# Patient Record
Sex: MALE | URBAN - METROPOLITAN AREA
[De-identification: names, ages, dates, MRNs, and addresses within clinical notes are randomized per-mention and may not be internally consistent; named-entity substitution may affect disease eponyms.]

---

## 2020-10-19 ENCOUNTER — OFFICE VISIT (OUTPATIENT)
Dept: URBAN - METROPOLITAN AREA CLINIC 52 | Facility: CLINIC | Age: 35
End: 2020-10-19
Payer: COMMERCIAL

## 2020-10-19 DIAGNOSIS — H05.20 EXOPHTHALMOS: ICD-10-CM

## 2020-10-19 PROCEDURE — 99204 OFFICE O/P NEW MOD 45 MIN: CPT | Performed by: OPHTHALMOLOGY

## 2020-10-19 ASSESSMENT — INTRAOCULAR PRESSURE
OS: 20
OD: 21
OD: 20
OS: 22

## 2020-10-19 NOTE — IMPRESSION/PLAN
Impression: Cortical age-related cataract, left eye: H25.012.  Plan: WOULD LIKE RETINA TO SEE TO ENSURE NO INTRA-OCULAR PATHOLOGY, GIVEN UNILATERAL CATARACT

## 2020-10-19 NOTE — IMPRESSION/PLAN
Impression: Exophthalmos: H05.20. Plan: recommend pt to see Dr Mary Lou Albertsing for proptosis consult to confirm my thoughts on the relative proptosis.

## 2020-10-19 NOTE — IMPRESSION/PLAN
Impression: Cortical age-related cataract, left eye: H25.012. Plan: OK for ce/iol  OS in [place] [address], RL2. AIM FAR. Discussed lens options, Standard. Pt is not a candidate for premium lens. Discussed need for CL for OD. Discussed diagnosis of cataracts. Cataracts are limiting vision. Discussed risks, benefits and alternatives to surgery including but not limited to: bleeding, infection, risk of vision loss, loss of the eye, need for other surgery. Patient voiced understanding and wishes to proceed. NOTE RE: PROPTOSIS. PT HAD A CT SCAN, WHICH REVEALED PROPTOSIS LEFT--BUT FELT TO BE DUE TO RIGHT HYPOPLASTIC ORBIT, SO THE PROPTOSIS IS RELATIVE.   CT SCAN FROM [facility] AT [facility] , AND IS SCANNED INTO THIS RECORD

## 2020-10-23 ENCOUNTER — NEW PATIENT (OUTPATIENT)
Dept: URBAN - METROPOLITAN AREA CLINIC 56 | Facility: CLINIC | Age: 35
End: 2020-10-23
Payer: COMMERCIAL

## 2020-10-23 DIAGNOSIS — H05.241 CONSTANT EXOPHTHALMOS, RIGHT EYE: Primary | ICD-10-CM

## 2020-10-23 DIAGNOSIS — H25.812 COMBINED FORMS OF AGE-RELATED CATARACT, LEFT EYE: ICD-10-CM

## 2020-10-23 DIAGNOSIS — H25.11 AGE-RELATED NUCLEAR CATARACT, RIGHT EYE: ICD-10-CM

## 2020-10-23 PROCEDURE — 92004 COMPRE OPH EXAM NEW PT 1/>: CPT | Performed by: OPTOMETRIST

## 2020-10-23 PROCEDURE — 92134 CPTRZ OPH DX IMG PST SGM RTA: CPT | Performed by: OPTOMETRIST

## 2020-10-23 ASSESSMENT — INTRAOCULAR PRESSURE
OS: 15
OD: 16

## 2020-10-23 ASSESSMENT — KERATOMETRY
OS: 43.55
OD: 43.63

## 2020-10-23 ASSESSMENT — VISUAL ACUITY
OS: 20/CF 4'
OD: 20/20

## 2021-02-12 ENCOUNTER — OFFICE VISIT (OUTPATIENT)
Dept: URBAN - METROPOLITAN AREA CLINIC 52 | Facility: CLINIC | Age: 36
End: 2021-02-12
Payer: COMMERCIAL

## 2021-02-12 PROCEDURE — 92014 COMPRE OPH EXAM EST PT 1/>: CPT | Performed by: OPHTHALMOLOGY

## 2021-02-12 ASSESSMENT — INTRAOCULAR PRESSURE
OS: 19
OS: 15
OD: 19
OD: 15

## 2021-02-12 NOTE — IMPRESSION/PLAN
Impression: Cortical age-related cataract, left eye: H25.012. Plan: Discussed diagnosis with patient. Patient has not been able to see retina. Discussed with patient that we will work on referral to retinal specialist--Dr. Carl Sylvester, with our organization,  to have b-scan ultrasound done--THIS IS NECESSARY PRIOR TO CATARACT TO SEE IF THERE IS ANY PATHOLOGY BEHIND THE CATARACT, AND MUST BE DONE BY [facility]. Will refer patient to retinal specialist for eval and b-scan. Will get in contact with retinal specialist to determine the best course of action before proceeding with cataract surgery. Addendum 03/26/21:
Patient was cleared by retina after having B-Scan. Will proceed with surgery as directed:
OK for ce/iol OS in ASC, RL2. AIM TBD. Discussed lens options, Standard or Toric - pending LUDWIG. Patient is not a candidate for MF lens. Discussed need for glasses for near vision with standard. Discussed diagnosis of cataracts. Cataracts are limiting vision. Discussed risks, benefits and alternatives to surgery including but not limited to: bleeding, infection, risk of vision loss, loss of the eye, need for other surgery. Patient voiced understanding and wishes to proceed. Recommend LUDWIG. Discussed doing Dexycu at the time of surgery. Due to the density of the cataract, unable to full view or hazy view of the retina. There may be underlying eye conditions that can't be seen today that may limit BCVA after surgery. Patient expressed understanding.

## 2021-03-22 ENCOUNTER — OFFICE VISIT (OUTPATIENT)
Dept: URBAN - METROPOLITAN AREA CLINIC 45 | Facility: CLINIC | Age: 36
End: 2021-03-22
Payer: COMMERCIAL

## 2021-03-22 PROCEDURE — 92134 CPTRZ OPH DX IMG PST SGM RTA: CPT | Performed by: OPHTHALMOLOGY

## 2021-03-22 PROCEDURE — 92014 COMPRE OPH EXAM EST PT 1/>: CPT | Performed by: OPHTHALMOLOGY

## 2021-03-22 PROCEDURE — 76512 OPH US DX B-SCAN: CPT | Performed by: OPHTHALMOLOGY

## 2021-03-22 ASSESSMENT — INTRAOCULAR PRESSURE
OS: 19
OD: 18

## 2021-03-23 NOTE — IMPRESSION/PLAN
Impression: Combined forms of age-related cataract, left eye: H25.812. Plan: Dense white unilateral cataract -- denies trauma, inflammation, FH
  bScan shows mobile vitreous, no RD Explained to patient final post CE VA dependent on retinal function Recheck here after CE - will make an appt for 2m OCT/DIL OU Refused dilation OD

## 2021-04-05 ENCOUNTER — TESTING ONLY (OUTPATIENT)
Dept: URBAN - METROPOLITAN AREA CLINIC 45 | Facility: CLINIC | Age: 36
End: 2021-04-05
Payer: COMMERCIAL

## 2021-04-05 PROCEDURE — 92025 CPTRIZED CORNEAL TOPOGRAPHY: CPT | Performed by: OPHTHALMOLOGY

## 2021-04-05 PROCEDURE — 92136 OPHTHALMIC BIOMETRY: CPT | Performed by: OPHTHALMOLOGY

## 2021-04-05 ASSESSMENT — PACHYMETRY
OS: 25.55
OS: 3.62

## 2021-04-09 ENCOUNTER — OFFICE VISIT (OUTPATIENT)
Dept: URBAN - METROPOLITAN AREA CLINIC 52 | Facility: CLINIC | Age: 36
End: 2021-04-09
Payer: COMMERCIAL

## 2021-04-09 DIAGNOSIS — H25.012 CORTICAL AGE-RELATED CATARACT, LEFT EYE: Primary | ICD-10-CM

## 2021-04-09 PROCEDURE — 99212 OFFICE O/P EST SF 10 MIN: CPT | Performed by: OPHTHALMOLOGY

## 2021-04-09 ASSESSMENT — VISUAL ACUITY: OD: 20/20

## 2021-04-09 NOTE — IMPRESSION/PLAN
Impression: Cortical age-related cataract, left eye: H25.012. Plan: OK for ce/iol OS in [place] [address], RL2. AIM -1.25. Cataracts are limiting vision. Discussed risks, benefits and alternatives to surgery including but not limited to: bleeding, infection, risk of vision loss, loss of the eye, need for other surgery. Patient voiced understanding and wishes to proceed. Discussed doing Dexycu at the time of surgery. Patient understands near intermediate aim OS. Discussed R/B/A of doing intermediate aim vs. far aim. Recommend [id number] +16.0 and +16. 5

## 2021-04-20 ENCOUNTER — SURGERY (OUTPATIENT)
Dept: URBAN - METROPOLITAN AREA SURGERY 20 | Facility: SURGERY | Age: 36
End: 2021-04-20
Payer: COMMERCIAL

## 2021-04-20 PROCEDURE — 66984 XCAPSL CTRC RMVL W/O ECP: CPT | Performed by: OPHTHALMOLOGY

## 2021-04-22 ENCOUNTER — POST-OPERATIVE VISIT (OUTPATIENT)
Dept: URBAN - METROPOLITAN AREA CLINIC 52 | Facility: CLINIC | Age: 36
End: 2021-04-22
Payer: COMMERCIAL

## 2021-04-22 PROCEDURE — 99024 POSTOP FOLLOW-UP VISIT: CPT | Performed by: OPHTHALMOLOGY

## 2021-04-22 ASSESSMENT — INTRAOCULAR PRESSURE: OS: 17

## 2021-04-22 ASSESSMENT — VISUAL ACUITY: OS: 20/20

## 2021-04-22 NOTE — IMPRESSION/PLAN
Impression: S/P 17689 Cataract Extraction by phacoemulsification with IOL placement; Dexycu; Trypan Blue OS - 2 Days. Encounter for surgical aftercare following surgery on a sense organ  Z48.810. Excellent post op course   Post operative instructions reviewed - Condition is improving - Plan: Patient is healing well. Patient to call us sooner with any questions or concerns. Instructed patient to use Inveltys as directed--Advised patient to use artificial tears for comfort.

## 2021-04-29 ENCOUNTER — POST-OPERATIVE VISIT (OUTPATIENT)
Dept: URBAN - METROPOLITAN AREA CLINIC 52 | Facility: CLINIC | Age: 36
End: 2021-04-29
Payer: COMMERCIAL

## 2021-04-29 DIAGNOSIS — Z48.810 ENCOUNTER FOR SURGICAL AFTERCARE FOLLOWING SURGERY ON A SENSE ORGAN: Primary | ICD-10-CM

## 2021-04-29 PROCEDURE — 99024 POSTOP FOLLOW-UP VISIT: CPT | Performed by: OPHTHALMOLOGY

## 2021-04-29 ASSESSMENT — VISUAL ACUITY: OS: 20/20

## 2021-04-29 ASSESSMENT — INTRAOCULAR PRESSURE: OS: 18

## 2021-04-29 NOTE — IMPRESSION/PLAN
Impression: S/P 33343 Cataract Extraction by phacoemulsification with IOL placement; Dexycu; Trypan Blue OS - 9 Days. Encounter for surgical aftercare following surgery on a sense organ  Z48.810. Excellent post op course   Post operative instructions reviewed - Condition is improving - Plan: Patient is healing well. Patient to call us sooner with any questions or concerns. Monitor yearly. Patient to call us sooner, onset of new visual changes. Keep scheduled appointment with Dr. Cece Peacock for dilation. Patient understands --Advised patient to use artificial tears for comfort.

## 2021-05-17 ENCOUNTER — OFFICE VISIT (OUTPATIENT)
Dept: URBAN - METROPOLITAN AREA CLINIC 45 | Facility: CLINIC | Age: 36
End: 2021-05-17
Payer: COMMERCIAL

## 2021-05-17 PROCEDURE — 92014 COMPRE OPH EXAM EST PT 1/>: CPT | Performed by: OPHTHALMOLOGY

## 2021-05-17 PROCEDURE — 92134 CPTRZ OPH DX IMG PST SGM RTA: CPT | Performed by: OPHTHALMOLOGY

## 2021-05-17 ASSESSMENT — INTRAOCULAR PRESSURE
OD: 15
OS: 14

## 2021-05-17 NOTE — IMPRESSION/PLAN
Impression: Lattice degeneration of retina, bilateral: H35.413. Plan:  Lattice OU -- no holes/tears - patient asymptomatic RD return precautions discussed PCIOL OS looks great  6m OCT/exam OU

## 2021-11-29 ENCOUNTER — OFFICE VISIT (OUTPATIENT)
Dept: URBAN - METROPOLITAN AREA CLINIC 45 | Facility: CLINIC | Age: 36
End: 2021-11-29
Payer: COMMERCIAL

## 2021-11-29 DIAGNOSIS — H35.413 LATTICE DEGENERATION OF RETINA, BILATERAL: Primary | ICD-10-CM

## 2021-11-29 PROCEDURE — 92014 COMPRE OPH EXAM EST PT 1/>: CPT | Performed by: OPHTHALMOLOGY

## 2021-11-29 ASSESSMENT — INTRAOCULAR PRESSURE
OD: 18
OS: 18

## 2021-11-29 NOTE — IMPRESSION/PLAN
Impression: Lattice degeneration of retina, bilateral: H35.413. Plan: Lattice OU -- FP taken -- no holes/tears - patient asymptomatic RD return precautions discussed PCIOL OS looks great  6m OCT/exam OU